# Patient Record
Sex: FEMALE | Race: WHITE | NOT HISPANIC OR LATINO | Employment: FULL TIME | URBAN - NONMETROPOLITAN AREA
[De-identification: names, ages, dates, MRNs, and addresses within clinical notes are randomized per-mention and may not be internally consistent; named-entity substitution may affect disease eponyms.]

---

## 2021-07-02 DIAGNOSIS — N64.89 OTHER SPECIFIED DISORDERS OF BREAST: ICD-10-CM

## 2022-06-20 ENCOUNTER — HOSPITAL ENCOUNTER (EMERGENCY)
Facility: HOSPITAL | Age: 47
Discharge: HOME | End: 2022-06-20
Attending: EMERGENCY MEDICINE | Admitting: EMERGENCY MEDICINE
Payer: COMMERCIAL

## 2022-06-20 ENCOUNTER — APPOINTMENT (EMERGENCY)
Dept: CT IMAGING | Facility: HOSPITAL | Age: 47
End: 2022-06-20
Payer: COMMERCIAL

## 2022-06-20 VITALS
WEIGHT: 199.3 LBS | RESPIRATION RATE: 16 BRPM | HEIGHT: 65 IN | SYSTOLIC BLOOD PRESSURE: 184 MMHG | TEMPERATURE: 98 F | HEART RATE: 94 BPM | DIASTOLIC BLOOD PRESSURE: 94 MMHG | OXYGEN SATURATION: 100 % | BODY MASS INDEX: 33.2 KG/M2

## 2022-06-20 DIAGNOSIS — R51.9 ACUTE NONINTRACTABLE HEADACHE, UNSPECIFIED HEADACHE TYPE: Primary | ICD-10-CM

## 2022-06-20 LAB
ALBUMIN SERPL BCP-MCNC: 3.7 G/DL (ref 3.5–5)
ALP SERPL-CCNC: 108 U/L (ref 46–116)
ALT SERPL W P-5'-P-CCNC: 11 U/L (ref 12–78)
ANION GAP SERPL CALCULATED.3IONS-SCNC: 8 MMOL/L (ref 4–13)
AST SERPL W P-5'-P-CCNC: 15 U/L (ref 5–45)
BASOPHILS # BLD AUTO: 0.03 THOUSANDS/ΜL (ref 0–0.1)
BASOPHILS NFR BLD AUTO: 0 % (ref 0–1)
BILIRUB SERPL-MCNC: 0.37 MG/DL (ref 0.2–1)
BUN SERPL-MCNC: 9 MG/DL (ref 5–25)
CALCIUM SERPL-MCNC: 9.2 MG/DL (ref 8.3–10.1)
CHLORIDE SERPL-SCNC: 102 MMOL/L (ref 100–108)
CO2 SERPL-SCNC: 26 MMOL/L (ref 21–32)
CREAT SERPL-MCNC: 0.83 MG/DL (ref 0.6–1.3)
EOSINOPHIL # BLD AUTO: 0.05 THOUSAND/ΜL (ref 0–0.61)
EOSINOPHIL NFR BLD AUTO: 1 % (ref 0–6)
ERYTHROCYTE [DISTWIDTH] IN BLOOD BY AUTOMATED COUNT: 18.1 % (ref 11.6–15.1)
GFR SERPL CREATININE-BSD FRML MDRD: 84 ML/MIN/1.73SQ M
GLUCOSE SERPL-MCNC: 127 MG/DL (ref 65–140)
HCT VFR BLD AUTO: 37.3 % (ref 34.8–46.1)
HGB BLD-MCNC: 10.6 G/DL (ref 11.5–15.4)
IMM GRANULOCYTES # BLD AUTO: 0.03 THOUSAND/UL (ref 0–0.2)
IMM GRANULOCYTES NFR BLD AUTO: 0 % (ref 0–2)
LYMPHOCYTES # BLD AUTO: 1.13 THOUSANDS/ΜL (ref 0.6–4.47)
LYMPHOCYTES NFR BLD AUTO: 16 % (ref 14–44)
MCH RBC QN AUTO: 22.8 PG (ref 26.8–34.3)
MCHC RBC AUTO-ENTMCNC: 28.4 G/DL (ref 31.4–37.4)
MCV RBC AUTO: 80 FL (ref 82–98)
MONOCYTES # BLD AUTO: 0.23 THOUSAND/ΜL (ref 0.17–1.22)
MONOCYTES NFR BLD AUTO: 3 % (ref 4–12)
NEUTROPHILS # BLD AUTO: 5.51 THOUSANDS/ΜL (ref 1.85–7.62)
NEUTS SEG NFR BLD AUTO: 80 % (ref 43–75)
NRBC BLD AUTO-RTO: 0 /100 WBCS
PLATELET # BLD AUTO: 296 THOUSANDS/UL (ref 149–390)
PMV BLD AUTO: 10.1 FL (ref 8.9–12.7)
POTASSIUM SERPL-SCNC: 3.8 MMOL/L (ref 3.5–5.3)
PROT SERPL-MCNC: 8.8 G/DL (ref 6.4–8.2)
RBC # BLD AUTO: 4.65 MILLION/UL (ref 3.81–5.12)
SODIUM SERPL-SCNC: 136 MMOL/L (ref 136–145)
WBC # BLD AUTO: 6.98 THOUSAND/UL (ref 4.31–10.16)

## 2022-06-20 PROCEDURE — 99284 EMERGENCY DEPT VISIT MOD MDM: CPT

## 2022-06-20 PROCEDURE — 96374 THER/PROPH/DIAG INJ IV PUSH: CPT

## 2022-06-20 PROCEDURE — 70450 CT HEAD/BRAIN W/O DYE: CPT

## 2022-06-20 PROCEDURE — 85025 COMPLETE CBC W/AUTO DIFF WBC: CPT | Performed by: EMERGENCY MEDICINE

## 2022-06-20 PROCEDURE — 99284 EMERGENCY DEPT VISIT MOD MDM: CPT | Performed by: EMERGENCY MEDICINE

## 2022-06-20 PROCEDURE — 96361 HYDRATE IV INFUSION ADD-ON: CPT

## 2022-06-20 PROCEDURE — 80053 COMPREHEN METABOLIC PANEL: CPT | Performed by: EMERGENCY MEDICINE

## 2022-06-20 PROCEDURE — 36415 COLL VENOUS BLD VENIPUNCTURE: CPT | Performed by: EMERGENCY MEDICINE

## 2022-06-20 PROCEDURE — 96375 TX/PRO/DX INJ NEW DRUG ADDON: CPT

## 2022-06-20 PROCEDURE — G1004 CDSM NDSC: HCPCS

## 2022-06-20 RX ORDER — SERTRALINE HYDROCHLORIDE 100 MG/1
100 TABLET, FILM COATED ORAL DAILY
COMMUNITY
Start: 2022-02-11

## 2022-06-20 RX ORDER — KETOROLAC TROMETHAMINE 30 MG/ML
15 INJECTION, SOLUTION INTRAMUSCULAR; INTRAVENOUS ONCE
Status: COMPLETED | OUTPATIENT
Start: 2022-06-20 | End: 2022-06-20

## 2022-06-20 RX ORDER — METOCLOPRAMIDE HYDROCHLORIDE 5 MG/ML
10 INJECTION INTRAMUSCULAR; INTRAVENOUS ONCE
Status: COMPLETED | OUTPATIENT
Start: 2022-06-20 | End: 2022-06-20

## 2022-06-20 RX ORDER — DIPHENHYDRAMINE HYDROCHLORIDE 50 MG/ML
12.5 INJECTION INTRAMUSCULAR; INTRAVENOUS ONCE
Status: COMPLETED | OUTPATIENT
Start: 2022-06-20 | End: 2022-06-20

## 2022-06-20 RX ORDER — LEVOTHYROXINE SODIUM 0.15 MG/1
150 TABLET ORAL DAILY
COMMUNITY

## 2022-06-20 RX ORDER — BUTALBITAL, ACETAMINOPHEN AND CAFFEINE 50; 325; 40 MG/1; MG/1; MG/1
1 TABLET ORAL EVERY 4 HOURS PRN
Qty: 30 TABLET | Refills: 0 | Status: SHIPPED | OUTPATIENT
Start: 2022-06-20 | End: 2022-06-30

## 2022-06-20 RX ADMIN — KETOROLAC TROMETHAMINE 15 MG: 30 INJECTION, SOLUTION INTRAMUSCULAR at 11:14

## 2022-06-20 RX ADMIN — SODIUM CHLORIDE 1000 ML: 0.9 INJECTION, SOLUTION INTRAVENOUS at 11:12

## 2022-06-20 RX ADMIN — DIPHENHYDRAMINE HYDROCHLORIDE 12.5 MG: 50 INJECTION, SOLUTION INTRAMUSCULAR; INTRAVENOUS at 11:13

## 2022-06-20 RX ADMIN — METOCLOPRAMIDE HYDROCHLORIDE 10 MG: 5 INJECTION INTRAMUSCULAR; INTRAVENOUS at 11:12

## 2022-06-20 NOTE — ED PROVIDER NOTES
History  Chief Complaint   Patient presents with    Headache     Woke up with headache this am  Pt complaining of nausea and vomitting     Patient states she tested positive for COVID 2 weeks ago  Now complains a right-sided headache that started this morning  Ibuprofen any relief  Had nausea and vomiting  No change in speech or vision  No focal weakness or numbness  Father had history of brain aneurysm  No trauma  No blood thinners  Never had similar headaches  No rashes  History provided by:  Patient   used: No    Headache  Location: Right-sided  Quality:  Dull  Timing:  Constant  Progression:  Unchanged  Chronicity:  New  Similar to prior headaches: no    Context: not coughing and not stress    Relieved by:  Nothing  Worsened by:  Light and sound  Ineffective treatments:  NSAIDs  Associated symptoms: photophobia and URI    Associated symptoms: no abdominal pain, no congestion, no cough, no diarrhea, no dizziness, no ear pain, no eye pain, no facial pain, no fever, no hearing loss, no loss of balance, no nausea, no neck pain, no neck stiffness, no seizures, no sore throat, no visual change and no vomiting        Prior to Admission Medications   Prescriptions Last Dose Informant Patient Reported? Taking?   levothyroxine 150 mcg tablet   Yes No   Sig: Take 150 mcg by mouth daily   sertraline (ZOLOFT) 100 mg tablet   Yes Yes   Sig: Take 100 mg by mouth daily      Facility-Administered Medications: None       Past Medical History:   Diagnosis Date    Hashimoto's disease        Past Surgical History:   Procedure Laterality Date    GALLBLADDER SURGERY         History reviewed  No pertinent family history  I have reviewed and agree with the history as documented      E-Cigarette/Vaping     E-Cigarette/Vaping Substances     Social History     Tobacco Use    Smoking status: Never Smoker    Smokeless tobacco: Never Used   Substance Use Topics    Alcohol use: Never    Drug use: Never       Review of Systems   Constitutional: Negative for chills and fever  HENT: Negative for congestion, ear pain, hearing loss, sore throat, trouble swallowing and voice change  Eyes: Positive for photophobia  Negative for pain and discharge  Respiratory: Negative for cough, shortness of breath and wheezing  Cardiovascular: Negative for chest pain and palpitations  Gastrointestinal: Negative for abdominal pain, blood in stool, constipation, diarrhea, nausea and vomiting  Genitourinary: Negative for dysuria, flank pain, frequency and hematuria  Musculoskeletal: Negative for joint swelling, neck pain and neck stiffness  Skin: Negative for rash and wound  Neurological: Positive for headaches  Negative for dizziness, seizures, syncope, facial asymmetry and loss of balance  Psychiatric/Behavioral: Negative for hallucinations, self-injury and suicidal ideas  All other systems reviewed and are negative  Physical Exam  Physical Exam  Vitals and nursing note reviewed  Constitutional:       General: She is not in acute distress  Appearance: She is well-developed  HENT:      Head: Normocephalic and atraumatic  Right Ear: External ear normal       Left Ear: External ear normal    Eyes:      General: No scleral icterus  Right eye: No discharge  Left eye: No discharge  Extraocular Movements: Extraocular movements intact  Conjunctiva/sclera: Conjunctivae normal    Cardiovascular:      Rate and Rhythm: Normal rate and regular rhythm  Heart sounds: Normal heart sounds  No murmur heard  Pulmonary:      Effort: Pulmonary effort is normal       Breath sounds: Normal breath sounds  No wheezing or rales  Abdominal:      General: Bowel sounds are normal  There is no distension  Palpations: Abdomen is soft  Tenderness: There is no abdominal tenderness  There is no guarding or rebound  Musculoskeletal:         General: No deformity   Normal range of motion  Cervical back: Normal range of motion and neck supple  Skin:     General: Skin is warm and dry  Findings: No rash  Neurological:      General: No focal deficit present  Mental Status: She is alert and oriented to person, place, and time  Cranial Nerves: No cranial nerve deficit  Psychiatric:         Mood and Affect: Mood normal          Behavior: Behavior normal          Thought Content:  Thought content normal          Judgment: Judgment normal          Vital Signs  ED Triage Vitals [06/20/22 1103]   Temperature Pulse Respirations Blood Pressure SpO2   98 °F (36 7 °C) 94 16 (!) 184/94 100 %      Temp Source Heart Rate Source Patient Position - Orthostatic VS BP Location FiO2 (%)   Temporal Monitor Sitting Left arm --      Pain Score       10 - Worst Possible Pain           Vitals:    06/20/22 1103   BP: (!) 184/94   Pulse: 94   Patient Position - Orthostatic VS: Sitting         Visual Acuity      ED Medications  Medications   sodium chloride 0 9 % bolus 1,000 mL (1,000 mL Intravenous New Bag 6/20/22 1112)   ketorolac (TORADOL) injection 15 mg (15 mg Intravenous Given 6/20/22 1114)   metoclopramide (REGLAN) injection 10 mg (10 mg Intravenous Given 6/20/22 1112)   diphenhydrAMINE (BENADRYL) injection 12 5 mg (12 5 mg Intravenous Given 6/20/22 1113)       Diagnostic Studies  Results Reviewed     Procedure Component Value Units Date/Time    Comprehensive metabolic panel [[id number]]  (Abnormal) Collected: 06/20/22 1114    Lab Status: Final result Specimen: Blood from Arm, Right Updated: 06/20/22 1137     Sodium 136 mmol/L      Potassium 3 8 mmol/L      Chloride 102 mmol/L      CO2 26 mmol/L      ANION GAP 8 mmol/L      BUN 9 mg/dL      Creatinine 0 83 mg/dL      Glucose 127 mg/dL      Calcium 9 2 mg/dL      AST 15 U/L      ALT 11 U/L      Alkaline Phosphatase 108 U/L      Total Protein 8 8 g/dL      Albumin 3 7 g/dL      Total Bilirubin 0 37 mg/dL      eGFR 84 ml/min/1 73sq m Narrative:      National Kid[employer] Disease Foundation guidelines for Chronic Kidney Disease (CKD):     Stage 1 with normal or high GFR (GFR > 90 mL/min/1 73 square meters)    Stage 2 Mild CKD (GFR = 60-89 mL/min/1 73 square meters)    Stage 3A Moderate CKD (GFR = 45-59 mL/min/1 73 square meters)    Stage 3B Moderate CKD (GFR = 30-44 mL/min/1 73 square meters)    Stage 4 Severe CKD (GFR = 15-29 mL/min/1 73 square meters)    Stage 5 End Stage CKD (GFR <15 mL/min/1 73 square meters)  Note: GFR calculation is accurate only with a steady state creatinine    CBC and differential [[id number]]  (Abnormal) Collected: 06/20/22 1114    Lab Status: Final result Specimen: Blood from Arm, Right Updated: 06/20/22 1122     WBC 6 98 Thousand/uL      RBC 4 65 Million/uL      Hemoglobin 10 6 g/dL      Hematocrit 37 3 %      MCV 80 fL      MCH 22 8 pg      MCHC 28 4 g/dL      RDW 18 1 %      MPV 10 1 fL      Platelets 806 Thousands/uL      nRBC 0 /100 WBCs      Neutrophils Relative 80 %      Immat GRANS % 0 %      Lymphocytes Relative 16 %      Monocytes Relative 3 %      Eosinophils Relative 1 %      Basophils Relative 0 %      Neutrophils Absolute 5 51 Thousands/µL      Immature Grans Absolute 0 03 Thousand/uL      Lymphocytes Absolute 1 13 Thousands/µL      Monocytes Absolute 0 23 Thousand/µL      Eosinophils Absolute 0 05 Thousand/µL      Basophils Absolute 0 03 Thousands/µL                  CT head without contrast   Final Result by Yumi Espinoza DO (06/20 1139)      No acute intracranial abnormality  Workstation performed: [id number]                    Procedures  Procedures         ED Course  ED Course as of 06/20/22 1149   Mon Jun 20, 2022   1145 Patient feeling better after migraine cocktail  CT scan is unremarkable  Patient is afebrile with normal white blood cell count  Neurologic exam is nonfocal   Headache is one-sided  Could be migraine  Patient stable for discharge  MDM  Number of Diagnoses or Management Options     Amount and/or Complexity of Data Reviewed  Clinical lab tests: reviewed  Review and summarize past medical records: yes        Disposition  Final diagnoses:   Acute nonintractable headache, unspecified headache type     Time reflects when diagnosis was documented in both MDM as applicable and the Disposition within this note     Time User Action Codes Description Comment    6/20/2022 11:47 AM Abbi Muralieddie Romo Add [R51 9] Acute nonintractable headache, unspecified headache type       ED Disposition     ED Disposition   Discharge    Condition   Stable    Date/Time   Mon Jun 20, 2022 11:47 AM    Comment   Collette Hollow Skinner discharge to home/self care  Follow-up Information     Follow up With Specialties Details Why Contact Info    Javad Ro MD Family Medicine Call in 2 days  [phone]            Patient's Medications   Discharge Prescriptions    BUTALBITAL-ACETAMINOPHEN-CAFFEINE (ESGIC) -40 MG PER TABLET    Take 1 tablet by mouth every 4 (four) hours as needed for headaches for up to 10 days       Start Date: 6/20/2022 End Date: 6/30/2022       Order Dose: 1 tablet       Quantity: 30 tablet    Refills: 0       No discharge procedures on file      PDMP Review     None          ED Provider  Electronically Signed by           Isabella Ross MD  06/20/22 7618

## 2023-09-13 ENCOUNTER — HOSPITAL ENCOUNTER (OUTPATIENT)
Dept: ULTRASOUND IMAGING | Facility: HOSPITAL | Age: 48
Discharge: HOME | End: 2023-09-13
Payer: COMMERCIAL

## 2023-09-13 DIAGNOSIS — R22.1 NECK MASS: ICD-10-CM

## 2023-09-13 PROCEDURE — 76536 US EXAM OF HEAD AND NECK: CPT

## 2023-11-22 ENCOUNTER — HOSPITAL ENCOUNTER (OUTPATIENT)
Dept: RADIOLOGY | Facility: CLINIC | Age: 48
Discharge: HOME | End: 2023-11-22
Payer: COMMERCIAL

## 2023-11-22 VITALS — BODY MASS INDEX: 34.96 KG/M2 | HEIGHT: 62 IN | WEIGHT: 190 LBS

## 2023-11-22 DIAGNOSIS — R92.8 ABNORMAL FINDINGS ON DIAGNOSTIC IMAGING OF BREAST: ICD-10-CM

## 2023-11-22 DIAGNOSIS — R92.8 ABNORMAL SCREENING MAMMOGRAM: ICD-10-CM

## 2023-11-22 PROCEDURE — 76642 ULTRASOUND BREAST LIMITED: CPT

## 2023-11-22 PROCEDURE — G0279 TOMOSYNTHESIS, MAMMO: HCPCS

## 2023-11-22 PROCEDURE — 77066 DX MAMMO INCL CAD BI: CPT

## 2024-12-01 ENCOUNTER — APPOINTMENT (OUTPATIENT)
Dept: RADIOLOGY | Age: 49
End: 2024-12-01
Payer: COMMERCIAL

## 2024-12-01 ENCOUNTER — OFFICE VISIT (OUTPATIENT)
Dept: URGENT CARE | Age: 49
End: 2024-12-01
Payer: COMMERCIAL

## 2024-12-01 VITALS
SYSTOLIC BLOOD PRESSURE: 163 MMHG | DIASTOLIC BLOOD PRESSURE: 83 MMHG | HEART RATE: 67 BPM | TEMPERATURE: 98.1 F | RESPIRATION RATE: 16 BRPM | OXYGEN SATURATION: 98 %

## 2024-12-01 DIAGNOSIS — M79.642 LEFT HAND PAIN: Primary | ICD-10-CM
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
## 2024-12-01 PROCEDURE — G0383 LEV 4 HOSP TYPE B ED VISIT: HCPCS | Performed by: EMERGENCY MEDICINE

## 2024-12-01 PROCEDURE — 73130 X-RAY EXAM OF HAND: CPT

## 2024-12-01 PROCEDURE — S9083 URGENT CARE CENTER GLOBAL: HCPCS | Performed by: EMERGENCY MEDICINE

## 2024-12-01 PROCEDURE — 29125 APPL SHORT ARM SPLINT STATIC: CPT | Performed by: EMERGENCY MEDICINE

## 2024-12-01 RX ORDER — ATORVASTATIN CALCIUM 20 MG/1
20 TABLET, FILM COATED ORAL EVERY MORNING
COMMUNITY
Start: 2024-08-31

## 2024-12-01 RX ORDER — NAPROXEN 500 MG/1
500 TABLET ORAL 2 TIMES DAILY WITH MEALS
Qty: 60 TABLET | Refills: 0 | Status: SHIPPED | OUTPATIENT
Start: 2024-12-01 | End: 2024-12-31

## 2024-12-01 NOTE — LETTER
December 1, 2024     Patient: Amaya Johnson   YOB: 1975   Date of Visit: 12/1/2024       To Whom It May Concern:    It is my medical opinion that Amaya Johnson may return to light duty immediately with the following restrictions: No use of left hand until reevaluated by a physician .    If you have any questions or concerns, please don't hesitate to call.         Sincerely,        Sukumar Kaufman,     CC: No Recipients

## 2024-12-03 ENCOUNTER — OFFICE VISIT (OUTPATIENT)
Dept: OBGYN CLINIC | Facility: CLINIC | Age: 49
End: 2024-12-03
Payer: COMMERCIAL

## 2024-12-03 VITALS
HEART RATE: 92 BPM | WEIGHT: 181.6 LBS | TEMPERATURE: 97.5 F | BODY MASS INDEX: 33.42 KG/M2 | HEIGHT: 62 IN | SYSTOLIC BLOOD PRESSURE: 128 MMHG | OXYGEN SATURATION: 100 % | DIASTOLIC BLOOD PRESSURE: 82 MMHG

## 2024-12-03 DIAGNOSIS — M79.642 LEFT HAND PAIN: ICD-10-CM

## 2024-12-03 PROCEDURE — 29125 APPL SHORT ARM SPLINT STATIC: CPT | Performed by: PHYSICIAN ASSISTANT

## 2024-12-03 PROCEDURE — 99203 OFFICE O/P NEW LOW 30 MIN: CPT | Performed by: STUDENT IN AN ORGANIZED HEALTH CARE EDUCATION/TRAINING PROGRAM

## 2024-12-03 NOTE — LETTER
December 3, 2024     Patient: Amaya Johnson  YOB: 1975  Date of Visit: 12/3/2024      To Whom it May Concern:    Amaya Johnson is under my professional care. Amaya was seen in my office on 12/3/2024. Amaya may return to work with limitations light duty with no use of the left hand .    If you have any questions or concerns, please don't hesitate to call.         Sincerely,          Jermaine Talley MD        CC: No Recipients

## 2024-12-03 NOTE — PROGRESS NOTES
ASSESSMENT/PLAN:    Assessment:   Left Thenar Sprain  Left 4th and 5th metacarpal head fractures    For the thenar pain there is no tenderness about the scaphoid or really over the bony prominences of the thumb metacarpal.  Therefore this can be treated as a sprain and she can be immobilized in respect to this.    In regards to the metacarpal heads, although these appear to be occult fractures as there is nothing on x-ray she does have very significant ecchymosis about this area with very specific tenderness about the metacarpal heads.  As such I do believe she has nondisplaced metacarpal head and neck fractures and should be treated as such.  We discussed treatment options with the patient on how to manage fracture nonoperatively.  We discussed immobilization options.  The patient has been using a removable ulnar gutter splint and does not like the feel of it.  She requested a custom splint be applied.  As such we applied a plaster ulnar gutter splint.  I discussed with her that I would like this to be worn for 2 weeks at which time the splint can be removed and she can be converted to the removable ulnar gutter with the assumption that the swelling will be improved and it will fit better.  Patient expressed understanding with the plan.  Patient elected to proceed with this nonoperative plan.  All questions were answered.      Plan:   I had a discussion with the patient regarding my clinical findings, diagnosis, and treatment plan.  All questions answered.    Ulnar gutter splint applied  Remain nonweightbearing of the left upper extremity  Elevate at rest  OTC NSAIDs/tylenol for pain management  Next Visit:  Return in about 2 weeks (around 12/17/2024)., with left hand xrays      _____________________________________________________  CHIEF COMPLAINT:  Left hand pain      SUBJECTIVE:  Amaya Johnson is a 49 y.o. right hand dominant female presenting for evaluation of left hand following a fall on Sunday 12/1/2024.  After injury he was initially evaluated by urgent care.  They were placed in a splint and referred for follow-up.  Since that time their pain has been moderately well-controlled.  They do not have numbness or tingling in the hand including no numbness or tingling in the median nerve distribution.  There is no pain in the elbow or shoulder.  Here to establish care.  She has been using a removable ulnar gutter splint but due to the swelling in her hand she does not like the feel of the splint.    DOI: 12/1/2024    Occupation: med nurse      PAST MEDICAL HISTORY:  Past Medical History:   Diagnosis Date    Hashimoto's disease        PAST SURGICAL HISTORY:  Past Surgical History:   Procedure Laterality Date    GALLBLADDER SURGERY         FAMILY HISTORY:  Family History   Problem Relation Age of Onset    No Known Problems Mother     No Known Problems Father     No Known Problems Sister     No Known Problems Sister     Breast cancer Sister 65    No Known Problems Maternal Grandmother     No Known Problems Maternal Grandfather     No Known Problems Paternal Grandmother     No Known Problems Paternal Grandfather     No Known Problems Maternal Aunt     No Known Problems Maternal Aunt     No Known Problems Maternal Aunt     No Known Problems Maternal Aunt     No Known Problems Maternal Aunt     Pancreatic cancer Paternal Aunt     Pancreatic cancer Brother        SOCIAL HISTORY:  Social History     Tobacco Use    Smoking status: Never    Smokeless tobacco: Never   Vaping Use    Vaping status: Never Used   Substance Use Topics    Alcohol use: Never    Drug use: Never       MEDICATIONS:    Current Outpatient Medications:     atorvastatin (LIPITOR) 20 mg tablet, Take 20 mg by mouth every morning, Disp: , Rfl:     levothyroxine 150 mcg tablet, Take 150 mcg by mouth daily, Disp: , Rfl:     naproxen (Naprosyn) 500 mg tablet, Take 1 tablet (500 mg total) by mouth 2 (two) times a day with meals, Disp: 60 tablet, Rfl: 0    sertraline  "(ZOLOFT) 100 mg tablet, Take 100 mg by mouth daily, Disp: , Rfl:     dinoprostone (CERVIDIL) 10 mg vaginal insert, Insert 10 mg into the vagina (Patient not taking: Reported on 12/3/2024), Disp: , Rfl:     ALLERGIES:  Allergies   Allergen Reactions    Amoxicillin Rash       REVIEW OF SYSTEMS:  Pertinent items are noted in HPI.  A comprehensive review of systems was negative.    LABS:  HgA1c:   Lab Results   Component Value Date    HGBA1C 5.4 11/14/2024     BMP:   Lab Results   Component Value Date    CALCIUM 9.1 09/13/2023    K 4.5 09/13/2023    CO2 21 (L) 09/13/2023     09/13/2023    BUN 8 09/13/2023    CREATININE 0.9 09/13/2023         _____________________________________________________  PHYSICAL EXAMINATION:  Vital signs: /82 (BP Location: Right arm, Patient Position: Sitting, Cuff Size: Large)   Pulse 92   Temp 97.5 °F (36.4 °C) (Temporal)   Ht 5' 2\" (1.575 m)   Wt 82.4 kg (181 lb 9.6 oz)   SpO2 100%   BMI 33.22 kg/m²   General: well developed and well nourished, alert, oriented times 3, and appears comfortable  Psychiatric: Normal  HEENT: Trachea Midline, No torticollis  Cardiovascular: No discernable arrhythmia  Pulmonary: No wheezing or stridor  Abdomen: No rebound or guarding  Extremities: No peripheral edema  Skin: No masses, erythema, lacerations, fluctation, ulcerations  Neurovascular: Sensation Intact to the Median, Ulnar, Radial Nerve, Motor Intact to the Median, Ulnar, Radial Nerve, and Pulses Intact    MUSCULOSKELETAL EXAMINATION:  Left hand  Skin intact  There is significant ecchymosis over the dorsal fourth and fifth rays about the metacarpal heads with significant tenderness about the metacarpal heads in these areas.  She has limited range of motion of the fourth and fifth metacarpals at the MP joints due to pain and stiffness in this area.  There is no pain in the proximal aspects of the metacarpals or along the ulnar wrist.  She has significant ecchymosis of the volar thenar " eminence and there is tenderness of the muscular thenar muscles however there is no tenderness about the scaphoid or along the bony prominences of the thumb ray  TTP:  Radial styloid: no   Scaphoid tubercle: no   Anatomic snuff box: no  SL interval: no   Ulnocarpal joint: no   Thenar muscle  No tenderness over thumb metacarpal   Tenderness over small and ring finger metacarpal heads  Range of Motion:  Elbow: extension/flexion 0/140  Forearm: pronation/supination 80/80  Wrist: extension/flexion 70/70  Digit: full AROM in DIP, PIP, MP joints  Motor Exam: firing AIN/PIN/U  Sensory Exam: Sensation intact to light touch in FDWS (radial), volar IF (median), volar SF (ulnar)  Vascular Exam: < 2 sec capillary refill     _____________________________________________________  STUDIES REVIEWED:  I reviewed imaging in PACS from 12/1/24 of the left hand which demonstrates no obvious fracture or dislocation      PROCEDURES PERFORMED:  Splint application    Date/Time: 12/3/2024 3:30 PM    Performed by: Mariusz Jackson PA-C  Authorized by: Jermaine Talley MD  Universal Protocol:  Consent: Verbal consent obtained.  Consent given by: patient    Pre-procedure details:     Sensation:  Normal  Procedure details:     Laterality:  Left    Location:  Hand    Splint type:  Ulnar gutter    Supplies:  Plaster and cotton padding           Scribe Attestation      I,:  Mariusz Jackson PA-C am acting as a scribe while in the presence of the attending physician.:       I,:  Jermaine Talley MD personally performed the services described in this documentation    as scribed in my presence.:

## 2024-12-10 NOTE — PROGRESS NOTES
"[facility]        NAME: Amaya Johnson is a 49 y.o. female  : 1975    MRN: [id number]  DATE: 2024  TIME: 6:10 PM    Assessment and Plan   Left hand pain [M79.642]  1. Left hand pain  naproxen (Naprosyn) 500 mg tablet    Ambulatory Referral to Orthopedic Surgery      2. Fall, initial encounter  XR hand 3+ vw left      No acute fracture or dislocation on personal review of imaging.  Patient does have significant ecchymosis and tenderness over the fourth and fifth metacarpals which raises concern for occult fracture.  She is otherwise neurovascular intact left upper extremity.  Offered patient formal ulnar gutter splint using Ortho-Glass versus removable aluminum splint, patient opted for aluminum splint to be utilized until seen by orthopedic.  Will arrange close outpatient follow-up with orthopedic surgery, advised patient that she develops significantly worsening pain, or development of focal numbness, tingling, or weakness in left upper extremity, or otherwise worsening of symptoms in the interim to seek care in emergency room. All questions answered at bedside , patient was amenable to plan and voiced understanding.     Orthopedic injury treatment    Date/Time: 2024 6:32 PM    Performed by: Sukumar Kaufman DO  Authorized by: Sukumar Kaufman DO    Patient Location:  [facility]  Coden Protocol:  procedure performed by consultantConsent: Verbal consent obtained.  Risks and benefits: risks, benefits and alternatives were discussed  Consent given by: patient  Time out: Immediately prior to procedure a \"time out\" was called to verify the correct patient, procedure, equipment, support staff and site/side marked as required.  Timeout called at: 2024 6:35 PM.  Patient understanding: patient states understanding of the procedure being performed  Patient consent: the patient's understanding of the procedure matches consent given  Procedure consent: procedure " consent matches procedure scheduled  Relevant documents: relevant documents present and verified  Test results: test results available and properly labeled  Site marked: the operative site was marked  Radiology Images displayed and confirmed. If images not available, report reviewed: imaging studies available  Required items: required blood products, implants, devices, and special equipment available  Patient identity confirmed: verbally with patient    Injury location:  Hand  Location details:  Left hand  Neurovascular status: Neurovascularly intact    Distal perfusion: normal    Neurological function: normal    Range of motion: normal    Immobilization:  Splint  Splint type:  Ulnar gutter  Supplies used:  Aluminum splint  Neurovascular status: Neurovascularly intact    Distal perfusion: normal    Neurological function: normal    Range of motion: normal    Patient tolerance:  Patient tolerated the procedure well with no immediate complications          Patient Instructions       Follow up with PCP in 3-5 days.  Proceed to  ER if symptoms worsen.    If tests have been performed at [facility], our office will contact you with results if changes need to be made to the care plan discussed with you at the visit.  You can review your full results on [facility] MyChart.    Chief Complaint     Chief Complaint   Patient presents with    Fall     Patient reports fall today, injured left hand.          History of Present Illness       49-year-old female presents with a chief complaint of left hand pain just sustaining a mechanical fall from standing.  Patient states that she tripped and fell backwards onto an outstretched left hand.  She denies strike loss consciousness.  She states that she noticed some pain ecchymosis and swelling to the ulnar aspect of her left hand immediately after the injury prompting her to come in for evaluation.  She endorses localized pain in the aforementioned area however denies any numbness  tingling or loss in the left hand.  Additionally, denies head strike, neck or back pain, or additional injury.    Fall  The pain is at a severity of 5/10. The pain is moderate. The symptoms are aggravated by use of injured limb. Pertinent negatives include no abdominal pain, bowel incontinence, fever, headaches, hearing loss, hematuria, loss of consciousness, nausea, numbness, tingling, visual change or vomiting. She has tried nothing for the symptoms.       Review of Systems   Review of Systems   Constitutional:  Negative for activity change, appetite change, chills, diaphoresis, fatigue, fever and unexpected weight change.   HENT:  Negative for congestion, dental problem, drooling, ear discharge, ear pain, facial swelling, hearing loss, mouth sores, nosebleeds, postnasal drip, rhinorrhea, sinus pressure, sinus pain, sneezing, sore throat, tinnitus, trouble swallowing and voice change.    Eyes:  Negative for pain and visual disturbance.   Respiratory:  Negative for cough and shortness of breath.    Cardiovascular:  Negative for chest pain and palpitations.   Gastrointestinal:  Negative for abdominal pain, bowel incontinence, nausea, rectal pain and vomiting.   Genitourinary:  Negative for dysuria and hematuria.   Musculoskeletal:  Positive for arthralgias and joint swelling. Negative for back pain, gait problem, myalgias, neck pain and neck stiffness.   Skin:  Negative for color change and rash.   Neurological:  Negative for dizziness, tingling, tremors, seizures, loss of consciousness, syncope, facial asymmetry, speech difficulty, weakness, light-headedness, numbness and headaches.   All other systems reviewed and are negative.        Current Medications       Current Outpatient Medications:     atorvastatin (LIPITOR) 20 mg tablet, Take 20 mg by mouth every morning, Disp: , Rfl:     levothyroxine 150 mcg tablet, Take 150 mcg by mouth daily, Disp: , Rfl:     naproxen (Naprosyn) 500 mg tablet, Take 1 tablet (500 mg  total) by mouth 2 (two) times a day with meals, Disp: 60 tablet, Rfl: 0    sertraline (ZOLOFT) 100 mg tablet, Take 100 mg by mouth daily, Disp: , Rfl:     dinoprostone (CERVIDIL) 10 mg vaginal insert, Insert 10 mg into the vagina (Patient not taking: Reported on 12/3/2024), Disp: , Rfl:     Current Allergies     Allergies as of 12/01/2024 - Reviewed 12/01/2024   Allergen Reaction Noted    Amoxicillin Rash 03/20/2015            The following portions of the patient's history were reviewed and updated as appropriate: allergies, current medications, past family history, past medical history, past social history, past surgical history and problem list.     Past Medical History:   Diagnosis Date    Hashimoto's disease        Past Surgical History:   Procedure Laterality Date    GALLBLADDER SURGERY         Family History   Problem Relation Age of Onset    No Known Problems Mother     No Known Problems Father     No Known Problems Sister     No Known Problems Sister     Breast cancer Sister 65    No Known Problems Maternal Grandmother     No Known Problems Maternal Grandfather     No Known Problems Paternal Grandmother     No Known Problems Paternal Grandfather     No Known Problems Maternal Aunt     No Known Problems Maternal Aunt     No Known Problems Maternal Aunt     No Known Problems Maternal Aunt     No Known Problems Maternal Aunt     Pancreatic cancer Paternal Aunt     Pancreatic cancer Brother          Medications have been verified.        Objective   /83   Pulse 67   Temp 98.1 °F (36.7 °C)   Resp 16   SpO2 98%   No LMP recorded.       Physical Exam     Physical Exam  Vitals and nursing note reviewed.   Constitutional:       General: She is not in acute distress.     Appearance: Normal appearance. She is not ill-appearing, toxic-appearing or diaphoretic.   HENT:      Head: Normocephalic and atraumatic.      Right Ear: External ear normal.      Left Ear: External ear normal.      Nose: Nose normal.       Mouth/Throat:      Mouth: Mucous membranes are moist.      Pharynx: No oropharyngeal exudate or posterior oropharyngeal erythema.   Eyes:      General: No visual field deficit or scleral icterus.        Right eye: No discharge.         Left eye: No discharge.      Extraocular Movements: Extraocular movements intact.      Pupils: Pupils are equal, round, and reactive to light.   Cardiovascular:      Rate and Rhythm: Normal rate and regular rhythm.      Pulses: Normal pulses.           Carotid pulses are 2+ on the right side and 2+ on the left side.       Radial pulses are 2+ on the right side and 2+ on the left side.      Heart sounds: No murmur heard.     No friction rub. No gallop.   Pulmonary:      Effort: Pulmonary effort is normal. No respiratory distress.      Breath sounds: Normal breath sounds. No stridor. No wheezing, rhonchi or rales.   Chest:      Chest wall: No tenderness.   Abdominal:      General: Abdomen is flat. There is no distension.      Palpations: Abdomen is soft. There is no mass.      Tenderness: There is no abdominal tenderness. There is no right CVA tenderness, left CVA tenderness, guarding or rebound.      Hernia: No hernia is present.   Musculoskeletal:         General: Swelling, tenderness and signs of injury present. No deformity.      Right shoulder: Normal.      Left shoulder: Normal. No swelling, deformity, effusion, laceration, tenderness, bony tenderness or crepitus. Normal range of motion. Normal strength. Normal pulse.      Left upper arm: No swelling, edema, deformity, lacerations, tenderness or bony tenderness.      Left elbow: No swelling, deformity, effusion or lacerations. Normal range of motion. No tenderness.      Right forearm: Normal.      Left forearm: Normal. No swelling, edema, deformity, lacerations, tenderness or bony tenderness.      Right wrist: Normal.      Left wrist: Normal. No swelling, deformity, effusion, lacerations, tenderness, bony tenderness, snuff box  tenderness or crepitus. Normal range of motion. Normal pulse.      Left hand: Swelling, tenderness and bony tenderness present. No deformity or lacerations. Decreased range of motion. Normal strength. Normal sensation. There is no disruption of two-point discrimination. Normal capillary refill. Normal pulse.      Cervical back: Normal, normal range of motion and neck supple.      Thoracic back: Normal.      Lumbar back: Normal.      Right lower leg: No edema.      Left lower leg: No edema.      Comments: There is diffuse swelling and tenderness noted over the fourth and fifth metacarpal heads with overlying ecchymosis   Skin:     General: Skin is warm and dry.      Findings: Bruising present.   Neurological:      General: No focal deficit present.      Mental Status: She is alert and oriented to person, place, and time.      Cranial Nerves: Cranial nerves 2-12 are intact. No cranial nerve deficit, dysarthria or facial asymmetry.      Sensory: Sensation is intact. No sensory deficit.      Motor: Motor function is intact. No weakness, atrophy, abnormal muscle tone, seizure activity or pronator drift.      Coordination: Coordination is intact. Coordination normal. Finger-Nose-Finger Test normal.      Gait: Gait is intact. Gait normal.      Comments: No focal sensory or motor deficits appreciated in the ulnar median, axillary, or radial nerve distributions in the left upper extremity.   Psychiatric:         Mood and Affect: Mood normal.         Behavior: Behavior normal.

## 2024-12-17 ENCOUNTER — OFFICE VISIT (OUTPATIENT)
Dept: OBGYN CLINIC | Facility: CLINIC | Age: 49
End: 2024-12-17
Payer: COMMERCIAL

## 2024-12-17 ENCOUNTER — HOSPITAL ENCOUNTER (OUTPATIENT)
Dept: RADIOLOGY | Facility: CLINIC | Age: 49
Discharge: HOME | End: 2024-12-17
Payer: COMMERCIAL

## 2024-12-17 VITALS
OXYGEN SATURATION: 100 % | TEMPERATURE: 96.9 F | BODY MASS INDEX: 33.64 KG/M2 | WEIGHT: 182.8 LBS | SYSTOLIC BLOOD PRESSURE: 140 MMHG | HEIGHT: 62 IN | DIASTOLIC BLOOD PRESSURE: 82 MMHG | HEART RATE: 79 BPM

## 2024-12-17 DIAGNOSIS — M79.642 LEFT HAND PAIN: Primary | ICD-10-CM

## 2024-12-17 DIAGNOSIS — M79.642 LEFT HAND PAIN: ICD-10-CM

## 2024-12-17 PROCEDURE — 73130 X-RAY EXAM OF HAND: CPT

## 2024-12-17 PROCEDURE — 99213 OFFICE O/P EST LOW 20 MIN: CPT | Performed by: STUDENT IN AN ORGANIZED HEALTH CARE EDUCATION/TRAINING PROGRAM

## 2024-12-17 NOTE — LETTER
December 17, 2024     Patient: Amaya Johnson  YOB: 1975  Date of Visit: 12/17/2024      To Whom it May Concern:    Amaya Johnson is under my professional care. Amaya was seen in my office on 12/17/2024. Amaya may return to work with limitations of limited weight bearing of the left hand to 5 lbs weight bearing. She has hand range of motion as tolerated but should wear the removable wrist splint during activities. This will be for the next 4 weeks.  .    If you have any questions or concerns, please don't hesitate to call.         Sincerely,          Jermaine Talley MD        CC: No Recipients

## 2024-12-18 NOTE — PROGRESS NOTES
Orthopedic Surgery Management Note  Amaya Johnson (49 y.o. female)  : 1975 Encounter Date: 2024    Assessment/Plan:  49 y.o. female with left fourth and fifth metacarpal contusions versus occult fracture.  The patient still has a surprising amount of tenderness over the metacarpals.  X-ray continues to demonstrate no evidence of fracture but given her tenderness which is very much localized specifically over these bones I think it is still possible that she has nondisplaced fractures present.  In either case at the least she would have a severe contusion.  I have chosen to initially immobilize her to help with pain control and as a precaution for the fracture.  Now we will transition her to a removable wrist splint which will assist with those processes but will allow her to start with some range of motion.  I think we can treat this as long as we limit her activity to something similar to a sprain in which we encourage range of motion but decrease activity in accordance with the patient's symptoms.  Patient expressed understanding with this.  Patient was involved in the decision making of her care.  All questions were answered    Immobilization: Removable wrist splint  Weight bearin pound weightbearing limit  Pain control: Tylenol and Motrin for pain  Elevate extremity at rest  Return in about 4 weeks (around 2025).  for evaluation with x-ray      Subjective:  49 y.o. female presenting to the office for 2 week follow up after a left hand injury that has resulted in tenderness about the fourth and fifth metacarpals.  She was in a splint for the past 2 weeks which she reports has helped with her pain.  With the splint removed she does still have some tenderness in the area and some ecchymosis there.  She has no numbness or tingling.  There is no pain elsewhere in her hand  DOI: 2024    Review of Systems  Review of systems negative unless otherwise specified in HPI    Past Medical  History  Past Medical History:   Diagnosis Date    Hashimoto's disease      Past Surgical History  Past Surgical History:   Procedure Laterality Date    GALLBLADDER SURGERY       Current Medications  Current Outpatient Medications on File Prior to Visit   Medication Sig Dispense Refill    atorvastatin (LIPITOR) 20 mg tablet Take 20 mg by mouth every morning      levothyroxine 150 mcg tablet Take 150 mcg by mouth daily      naproxen (Naprosyn) 500 mg tablet Take 1 tablet (500 mg total) by mouth 2 (two) times a day with meals 60 tablet 0    sertraline (ZOLOFT) 100 mg tablet Take 100 mg by mouth daily      dinoprostone (CERVIDIL) 10 mg vaginal insert Insert 10 mg into the vagina (Patient not taking: Reported on 12/17/2024)       No current facility-administered medications on file prior to visit.       Physical exam  Exam: left hand  Skin is intact  Swelling and ecchymosis over the dorsal and dorsal ulnar aspect of the hand  She still has tenderness specifically along the bony prominences of the fourth and fifth metacarpals more so towards the distal end.  She otherwise has no tenderness throughout the remainder of the hand  She is able to make a full composite fist with full flexion extension with all extensor tendons intact  She is neurovascularly intact.  Motor is intact in AIN, PIN, and IO.  Sensory is intact to the median, radial, and ulnar nerve distributions      Imaging:  Repeat x-rays today in the office of the left hand demonstrate no evidence of fracture or dislocation    Scribe Attestation      I,:   am acting as a scribe while in the presence of the attending physician.:       I,:   personally performed the services described in this documentation    as scribed in my presence.:

## 2025-01-14 ENCOUNTER — OFFICE VISIT (OUTPATIENT)
Dept: OBGYN CLINIC | Facility: CLINIC | Age: 50
End: 2025-01-14
Payer: COMMERCIAL

## 2025-01-14 ENCOUNTER — HOSPITAL ENCOUNTER (OUTPATIENT)
Dept: RADIOLOGY | Facility: CLINIC | Age: 50
Discharge: HOME | End: 2025-01-14
Payer: COMMERCIAL

## 2025-01-14 VITALS — WEIGHT: 182.4 LBS | BODY MASS INDEX: 33.57 KG/M2 | HEIGHT: 62 IN

## 2025-01-14 DIAGNOSIS — M79.642 LEFT HAND PAIN: ICD-10-CM

## 2025-01-14 DIAGNOSIS — S69.92XD INJURY OF COLLATERAL LIGAMENT OF FINGER OF LEFT HAND, SUBSEQUENT ENCOUNTER: Primary | ICD-10-CM

## 2025-01-14 PROCEDURE — 73130 X-RAY EXAM OF HAND: CPT

## 2025-01-14 PROCEDURE — 99213 OFFICE O/P EST LOW 20 MIN: CPT | Performed by: STUDENT IN AN ORGANIZED HEALTH CARE EDUCATION/TRAINING PROGRAM

## 2025-01-14 NOTE — PROGRESS NOTES
Orthopedic Surgery Management Note  Amaya Johnson (49 y.o. female)  : 1975 Encounter Date: 2025      Assessment and Plan:  1. Injury of collateral ligament of finger of left hand, subsequent encounter        2. Left hand pain  XR hand 3+ vw left          49 y.o. female presents in follow up for left hand fourth and fifth metacarpal contusion as well as a small finger MP joint radial collateral ligament injury.  Her symptoms have greatly improved since the last saw her.  She now has minimal tenderness and good range of motion.  She still has some specific point tenderness along the radial aspect of the MP joint of the small finger with some slight laxity with stress testing relative to the contralateral side.  I discussed with her that ligamentous injuries although provisionally healed at 6 weeks can take 3 to 6 months to fully recover from.  We discussed the nature of these injuries and how to recover from them.  At this point she can continue with general hand range of motion and should expect to gently recover over the next 6 weeks.  I advised her to avoid activities that might stress the radial collateral ligament and if she is going to participate in any activities she should use buddy loops for the fingers to protect them.  She expressed understanding with this.  As she is recovering well I think she can have a follow-up as needed for symptoms that worsen or fail to improve.    Patient has noticed significant improvement in symptoms since last visit.   Discussed that patient sustained a tear of the UCL of left fourth digit and is now six weeks out from date of injury.   May return to activities as tolerated.   Only restriction is use buddy taping while playing ball sports for the next few weeks.   Return to work note provided  Patient may follow-up as needed    she expressed understanding of the plan and agreed. We encouraged them to contact our office with any questions or concerns.     Return  if symptoms worsen or fail to improve.        Chief Complaint:     Left hand follow-up     Previous History:   Patient sustained left fourth and fifth metacarpal contusions s/p injury on 12/01/2024. Patient was last seen in the office on 12/17/2024 where patient was advised on use of removable wrist splint.     Interval History:  Patient is now six weeks out from injury. Patient reports overall pain has improved but she continues to have discomfort in the webspace between the fourth and fifth digits. Patient states she still cannot wear her wedding bands due to swelling in the left hand.  She has however been able to return to all their other activities she had prior to the injury and has returned to functioning to all her activities of daily living.    Past Medical History:  Past Medical History:   Diagnosis Date    Hashimoto's disease      Past Surgical History:   Procedure Laterality Date    GALLBLADDER SURGERY       Family History   Problem Relation Age of Onset    No Known Problems Mother     No Known Problems Father     No Known Problems Sister     No Known Problems Sister     Breast cancer Sister 65    No Known Problems Maternal Grandmother     No Known Problems Maternal Grandfather     No Known Problems Paternal Grandmother     No Known Problems Paternal Grandfather     No Known Problems Maternal Aunt     No Known Problems Maternal Aunt     No Known Problems Maternal Aunt     No Known Problems Maternal Aunt     No Known Problems Maternal Aunt     Pancreatic cancer Paternal Aunt     Pancreatic cancer Brother      Social History     Socioeconomic History    Marital status: /Civil Union     Spouse name: Not on file    Number of children: Not on file    Years of education: Not on file    Highest education level: Not on file   Occupational History    Not on file   Tobacco Use    Smoking status: Never    Smokeless tobacco: Never   Vaping Use    Vaping status: Never Used   Substance and Sexual Activity     Alcohol use: Never    Drug use: Never    Sexual activity: Not on file   Other Topics Concern    Not on file   Social History Narrative    Not on file     Social Drivers of Health     Financial Resource Strain: Low Risk  (11/14/2024)    Received from [email or web address]    Financial Resource Strain     Do you have any trouble paying for your medications, or do you think you might in the future?: No     Does your family have trouble paying for medicine? (Household - for ages 0-17 years): Not on file   Food Insecurity: No Food Insecurity (11/14/2024)    Received from [email or web address]    Hunger Vital Sign     Worried About Running Out of Food in the Last Year: Never true     Ran Out of Food in the Last Year: Never true   Transportation Needs: No Transportation Needs (11/14/2024)    Received from [email or web address]    Transportation Needs     Do you have trouble getting a ride to medical visits or work? (Adult - for ages 18 years and over): Not on file     Does your family have a hard time getting a ride to doctors’ visits? (Household - for ages 0-17 years): Not on file     Has lack of transportation kept you from medical appointments, meetings, work, or from getting things needed for daily living? Check all that apply.: No     Do you (or your family) have trouble finding or paying for a ride (transportation)? (Household - for ages 0-17 years): Not on file   Physical Activity: Not on file   Stress: Not on file   Social Connections: Socially Integrated (11/14/2024)    Received from [email or web address]    Social Connections     How often do you feel lonely or isolated from those around you?: Never   Intimate Partner Violence: Not on file   Housing Stability: Low Risk  (11/14/2024)    Received from [email or web address]    Housing Stability     Do you currently live in a shelter or have no steady place to sleep at night?: No     Do you think you are at risk of becoming homeless? (Adult - for ages 18 years and over): Not on file     Does your family worry about paying for  "your home or becoming homeless? (Household - for ages 0-17 years): Not on file     Are you homeless or worried that you might be in the future?: No     Are you (or your family) homeless or worried that you might be in the future? (Household - for ages 0-17 years): Not on file     Scheduled Meds:  Continuous Infusions:No current facility-administered medications for this visit.    PRN Meds:.  Allergies   Allergen Reactions    Amoxicillin Rash       Physical Examination:    Ht 5' 2\" (1.575 m)   Wt 82.7 kg (182 lb 6.4 oz)   BMI 33.36 kg/m²     Gen: A&Ox3, NAD  Cardiac: regular rate  Chest: non labored breathing  Abdomen: Non-distended      Left Upper Extremity:  Skin CDI  No significant swelling or ecchymosis present.   Sensation intact to light touch in the axillary median, ulnar, and radial nerve distributions  Minimal TTP over fourth and fifth metacarpal shaft  Maximal TTP over Interspace between 4th and 5th MCP  NV intact  Mild laxity with deviation of ring MP joint with flexion at 90 and stressing ulnar collateral ligament but there is a firm endpoint.  Radial collateral ligament is stable on testing.   She is able to make a full composite fist with full reciprocal extension of the fingers  Motor and sensation intact throughout the hand    Studies:  Radiographs: I personally reviewed and independently interpreted the available radiographs.   1/14/2025: Radiographs of the left hand, multiple views, demonstrate no acute fracture or dislocation.         Jermaine Talley MD  Hand and Upper Extremity Surgery      *This note was dictated using Dragon voice recognition software. Please excuse any word substitutions or errors.*      Scribe Attestation      I,:  Misty Higgins PA-C am acting as a scribe while in the presence of the attending physician.:       I,:  Jermaine Talley MD personally performed the services described in this documentation    as scribed in my presence.:             "

## 2025-01-14 NOTE — LETTER
January 14, 2025     Patient: Amaya Johnson  YOB: 1975  Date of Visit: 1/14/2025      To Whom it May Concern:    Amaya Johnson is under my professional care. Amaya was seen in my office on 1/14/2025. Amaya may return to work with activities as tolerated without restrictions starting 1/15/2025.     If you have any questions or concerns, please don't hesitate to call.         Sincerely,          Jermaine Talley MD        CC: No Recipients